# Patient Record
Sex: MALE | Race: BLACK OR AFRICAN AMERICAN | NOT HISPANIC OR LATINO | URBAN - METROPOLITAN AREA
[De-identification: names, ages, dates, MRNs, and addresses within clinical notes are randomized per-mention and may not be internally consistent; named-entity substitution may affect disease eponyms.]

---

## 2022-03-04 ENCOUNTER — EMERGENCY (EMERGENCY)
Facility: HOSPITAL | Age: 42
LOS: 1 days | Discharge: HOME | End: 2022-03-04
Attending: STUDENT IN AN ORGANIZED HEALTH CARE EDUCATION/TRAINING PROGRAM
Payer: MEDICAID

## 2022-03-04 VITALS
RESPIRATION RATE: 18 BRPM | TEMPERATURE: 97 F | OXYGEN SATURATION: 100 % | HEIGHT: 72 IN | DIASTOLIC BLOOD PRESSURE: 83 MMHG | HEART RATE: 75 BPM | WEIGHT: 190.04 LBS | SYSTOLIC BLOOD PRESSURE: 138 MMHG

## 2022-03-04 PROCEDURE — 99285 EMERGENCY DEPT VISIT HI MDM: CPT

## 2022-03-04 PROCEDURE — 99284 EMERGENCY DEPT VISIT MOD MDM: CPT

## 2022-03-04 PROCEDURE — 82962 GLUCOSE BLOOD TEST: CPT

## 2022-03-04 NOTE — ED ADULT NURSE NOTE - HISTORY OF COVID-19 VACCINATION
----- Message from Adarsh Jaramillo NP sent at 12/27/2019  9:34 AM CST -----  Harish Francois,  Pt is scheduled to see me on 1/28/19. Since I am gone that following week, could you check with pt and see if he is willing to come see me around 1/22 or 1/23 or 1/24.     Let's do the repeat Holter 3-4 days before he sees me so I can review his Holter study report with him. Otherwise, I will order when I see him that day.  Thank you! CY       ----- Message -----  From: Ashleigh Lang MD  Sent: 12/26/2019   2:16 PM CST  To: Haja Ascencio MD, Adarsh Jaramillo NP    ThanksJasper.    Adarsh, you are seeing Kody Johns at the end of January. I stopped his metoprolol this week. Can you repeat a holter when you see him?    Thanks, EG  ----- Message -----  From: Haja Ascencio MD  Sent: 12/26/2019   8:31 AM CST  To: MD Ashleigh Arnett,  This may be a duplicate message.  Chart was reviewed and patient has evidence for sinus node dysfunction with junctional escape rhythm seen on 12-lead ECG on December 13 with heart rate in the 40s, resting sinus bradycardia and low peak heart rate of 79 bpm although he appears to be relatively sedentary after his stroke according to your notes.  He is also had atrial flutter with 2-1 AV conduction and heart rates around 100 bpm perioperatively.  Dual-chamber permanent pacemaker would be reasonable but it is unclear to me how much symptomatic benefit he might have.  dd  ----- Message -----  From: Ashleigh Lang MD  Sent: 12/26/2019   8:03 AM CST  To: MD Jasper Domingo, this is the patient of mine with the weird GAYLA a couple of weeks ago with flutter. You had recommended this holter for f/u. I had stopped his amiodarone.    EG         Vaccine status unknown

## 2022-03-04 NOTE — ED PROVIDER NOTE - PATIENT PORTAL LINK FT
You can access the FollowMyHealth Patient Portal offered by [facility] by registering at the following website: [email or web address]. By joining [facility]’s FollowMyHealth portal, you will also be able to view your health information using other applications (apps) compatible with our system.

## 2022-03-04 NOTE — ED ADULT TRIAGE NOTE - CHIEF COMPLAINT QUOTE
Per EMS overdosed on unknown substance at shelter. Narcan given prior to EMS arrival. Pt awake answering some questions on arrival to ER

## 2022-03-04 NOTE — ED PROVIDER NOTE - OBJECTIVE STATEMENT
41M, unknown pmh, presenting after being given narcan. patient took unknown substances and was given narcan prior to EMS arrival. patient not currently answering any questions.

## 2022-03-04 NOTE — ED PROVIDER NOTE - CLINICAL SUMMARY MEDICAL DECISION MAKING FREE TEXT BOX
41M presenting after receiving narcan after a drug overdose. patient not answer questions regarding today's events or what drugs he has taken. patient awake, alert, clinically sober and ambulatory with steady gait. will observe and get SW consult.

## 2022-03-05 NOTE — CHART NOTE - NSCHARTNOTEFT_GEN_A_CORE
Pt is a 41 year old male who came to the ED with the complaint of  overdose of unknown substance. Pt  screened positive for sbirt . Pt seen at bedside , alert and orientedx3Gloria Fonseca introduced self and role explained.  Pt denied alcohol /illicit drug use. High suspicion of homelessness but he denied being homeless when asked.   Emotional support and resources for SA and  shelter provided. Sbirt screen completed in sunrise. Pt was socially cleared and Physician made aware.
